# Patient Record
Sex: MALE | Race: OTHER | Employment: FULL TIME | URBAN - METROPOLITAN AREA
[De-identification: names, ages, dates, MRNs, and addresses within clinical notes are randomized per-mention and may not be internally consistent; named-entity substitution may affect disease eponyms.]

---

## 2017-02-21 ENCOUNTER — APPOINTMENT (OUTPATIENT)
Dept: ULTRASOUND IMAGING | Age: 37
DRG: 439 | End: 2017-02-21
Attending: EMERGENCY MEDICINE
Payer: COMMERCIAL

## 2017-02-21 ENCOUNTER — APPOINTMENT (OUTPATIENT)
Dept: CT IMAGING | Age: 37
DRG: 439 | End: 2017-02-21
Attending: EMERGENCY MEDICINE
Payer: COMMERCIAL

## 2017-02-21 ENCOUNTER — HOSPITAL ENCOUNTER (INPATIENT)
Facility: HOSPITAL | Age: 37
LOS: 5 days | Discharge: HOME | DRG: 439 | End: 2017-02-26
Attending: EMERGENCY MEDICINE | Admitting: STUDENT IN AN ORGANIZED HEALTH CARE EDUCATION/TRAINING PROGRAM
Payer: COMMERCIAL

## 2017-02-21 DIAGNOSIS — K85.90 ACUTE PANCREATITIS, UNSPECIFIED COMPLICATION STATUS, UNSPECIFIED PANCREATITIS TYPE: Primary | ICD-10-CM

## 2017-02-21 PROBLEM — D72.829 LEUKOCYTOSIS: Status: ACTIVE | Noted: 2017-02-21

## 2017-02-21 PROBLEM — R73.9 HYPERGLYCEMIA: Status: ACTIVE | Noted: 2017-02-21

## 2017-02-21 PROBLEM — E87.1 HYPONATREMIA: Status: ACTIVE | Noted: 2017-02-21

## 2017-02-21 LAB
ALBUMIN SERPL-MCNC: 3.8 G/DL (ref 3.5–4.8)
ALP LIVER SERPL-CCNC: 73 U/L (ref 45–117)
ALT SERPL-CCNC: 28 U/L (ref 17–63)
AST SERPL-CCNC: 34 U/L (ref 15–41)
BASOPHILS # BLD AUTO: 0.06 X10(3) UL (ref 0–0.1)
BASOPHILS NFR BLD AUTO: 0.4 %
BILIRUB SERPL-MCNC: 1 MG/DL (ref 0.1–2)
BILIRUB UR QL STRIP.AUTO: NEGATIVE
BUN BLD-MCNC: 11 MG/DL (ref 8–20)
CALCIUM BLD-MCNC: 8 MG/DL (ref 8.3–10.3)
CHLORIDE: 96 MMOL/L (ref 101–111)
CLARITY UR REFRACT.AUTO: CLEAR
CO2: 24 MMOL/L (ref 22–32)
COLOR UR AUTO: YELLOW
CREAT BLD-MCNC: 1.08 MG/DL (ref 0.7–1.3)
EOSINOPHIL # BLD AUTO: 0.07 X10(3) UL (ref 0–0.3)
EOSINOPHIL NFR BLD AUTO: 0.5 %
ERYTHROCYTE [DISTWIDTH] IN BLOOD BY AUTOMATED COUNT: 12.9 % (ref 11.5–16)
GLUCOSE BLD-MCNC: 128 MG/DL (ref 70–99)
GLUCOSE UR STRIP.AUTO-MCNC: NEGATIVE MG/DL
HCT VFR BLD AUTO: 44.9 % (ref 37–53)
HGB BLD-MCNC: 16.8 G/DL (ref 13–17)
IMMATURE GRANULOCYTE COUNT: 0.04 X10(3) UL (ref 0–1)
IMMATURE GRANULOCYTE RATIO %: 0.3 %
KETONES UR STRIP.AUTO-MCNC: NEGATIVE MG/DL
LEUKOCYTE ESTERASE UR QL STRIP.AUTO: NEGATIVE
LIPASE: >7500 U/L (ref 73–393)
LYMPHOCYTES # BLD AUTO: 0.68 X10(3) UL (ref 0.9–4)
LYMPHOCYTES NFR BLD AUTO: 4.8 %
M PROTEIN MFR SERPL ELPH: 6.4 G/DL (ref 6.1–8.3)
MCH RBC QN AUTO: 29.4 PG (ref 27–33.2)
MCHC RBC AUTO-ENTMCNC: 37.4 G/DL (ref 31–37)
MCV RBC AUTO: 78.5 FL (ref 80–99)
MONOCYTES # BLD AUTO: 0.95 X10(3) UL (ref 0.1–0.6)
MONOCYTES NFR BLD AUTO: 6.7 %
NEUTROPHIL ABS PRELIM: 12.33 X10 (3) UL (ref 1.3–6.7)
NEUTROPHILS # BLD AUTO: 12.33 X10(3) UL (ref 1.3–6.7)
NEUTROPHILS NFR BLD AUTO: 87.3 %
NITRITE UR QL STRIP.AUTO: NEGATIVE
PH UR STRIP.AUTO: 5.5 [PH] (ref 4.5–8)
PLATELET # BLD AUTO: 220 10(3)UL (ref 150–450)
POTASSIUM SERPL-SCNC: 4.8 MMOL/L (ref 3.6–5.1)
RBC # BLD AUTO: 5.72 X10(6)UL (ref 4.3–5.7)
RED CELL DISTRIBUTION WIDTH-SD: 36.4 FL (ref 35.1–46.3)
SODIUM SERPL-SCNC: 132 MMOL/L (ref 136–144)
SP GR UR STRIP.AUTO: 1.02 (ref 1–1.03)
TRIGLYCERIDES: >5000 MG/DL (ref ?–150)
UROBILINOGEN UR STRIP.AUTO-MCNC: 0.2 MG/DL
WBC # BLD AUTO: 14.1 X10(3) UL (ref 4–13)

## 2017-02-21 PROCEDURE — 74176 CT ABD & PELVIS W/O CONTRAST: CPT

## 2017-02-21 PROCEDURE — 76700 US EXAM ABDOM COMPLETE: CPT

## 2017-02-21 PROCEDURE — 99223 1ST HOSP IP/OBS HIGH 75: CPT | Performed by: HOSPITALIST

## 2017-02-21 RX ORDER — HYDROMORPHONE HYDROCHLORIDE 1 MG/ML
1 INJECTION, SOLUTION INTRAMUSCULAR; INTRAVENOUS; SUBCUTANEOUS
Status: DISCONTINUED | OUTPATIENT
Start: 2017-02-21 | End: 2017-02-24

## 2017-02-21 RX ORDER — SODIUM CHLORIDE 9 MG/ML
INJECTION, SOLUTION INTRAVENOUS CONTINUOUS
Status: DISCONTINUED | OUTPATIENT
Start: 2017-02-22 | End: 2017-02-26

## 2017-02-21 RX ORDER — DOCUSATE SODIUM 100 MG/1
100 CAPSULE, LIQUID FILLED ORAL 2 TIMES DAILY
Status: DISCONTINUED | OUTPATIENT
Start: 2017-02-22 | End: 2017-02-26

## 2017-02-21 RX ORDER — ENOXAPARIN SODIUM 100 MG/ML
40 INJECTION SUBCUTANEOUS DAILY
Status: DISCONTINUED | OUTPATIENT
Start: 2017-02-21 | End: 2017-02-26

## 2017-02-21 RX ORDER — ONDANSETRON 2 MG/ML
8 INJECTION INTRAMUSCULAR; INTRAVENOUS EVERY 6 HOURS PRN
Status: DISCONTINUED | OUTPATIENT
Start: 2017-02-21 | End: 2017-02-26

## 2017-02-21 RX ORDER — ONDANSETRON 2 MG/ML
4 INJECTION INTRAMUSCULAR; INTRAVENOUS ONCE
Status: COMPLETED | OUTPATIENT
Start: 2017-02-21 | End: 2017-02-21

## 2017-02-21 RX ORDER — SODIUM CHLORIDE 9 MG/ML
INJECTION, SOLUTION INTRAVENOUS CONTINUOUS
Status: ACTIVE | OUTPATIENT
Start: 2017-02-21 | End: 2017-02-22

## 2017-02-21 RX ORDER — KETOROLAC TROMETHAMINE 30 MG/ML
30 INJECTION, SOLUTION INTRAMUSCULAR; INTRAVENOUS ONCE
Status: COMPLETED | OUTPATIENT
Start: 2017-02-21 | End: 2017-02-21

## 2017-02-21 RX ORDER — TRAZODONE HYDROCHLORIDE 50 MG/1
50 TABLET ORAL NIGHTLY PRN
Status: DISCONTINUED | OUTPATIENT
Start: 2017-02-21 | End: 2017-02-26

## 2017-02-21 RX ORDER — HYDROMORPHONE HYDROCHLORIDE 1 MG/ML
INJECTION, SOLUTION INTRAMUSCULAR; INTRAVENOUS; SUBCUTANEOUS
Status: DISCONTINUED | OUTPATIENT
Start: 2017-02-21 | End: 2017-02-26

## 2017-02-21 RX ORDER — HYDROMORPHONE HYDROCHLORIDE 1 MG/ML
0.5 INJECTION, SOLUTION INTRAMUSCULAR; INTRAVENOUS; SUBCUTANEOUS
Status: DISCONTINUED | OUTPATIENT
Start: 2017-02-21 | End: 2017-02-24

## 2017-02-21 RX ORDER — HYDROMORPHONE HYDROCHLORIDE 1 MG/ML
1 INJECTION, SOLUTION INTRAMUSCULAR; INTRAVENOUS; SUBCUTANEOUS ONCE
Status: COMPLETED | OUTPATIENT
Start: 2017-02-21 | End: 2017-02-21

## 2017-02-21 RX ORDER — HYDROMORPHONE HYDROCHLORIDE 1 MG/ML
0.5 INJECTION, SOLUTION INTRAMUSCULAR; INTRAVENOUS; SUBCUTANEOUS EVERY 30 MIN PRN
Status: DISPENSED | OUTPATIENT
Start: 2017-02-21 | End: 2017-02-21

## 2017-02-21 RX ORDER — ACETAMINOPHEN 325 MG/1
650 TABLET ORAL EVERY 6 HOURS PRN
Status: DISCONTINUED | OUTPATIENT
Start: 2017-02-21 | End: 2017-02-26

## 2017-02-21 RX ORDER — ONDANSETRON 2 MG/ML
4 INJECTION INTRAMUSCULAR; INTRAVENOUS EVERY 4 HOURS PRN
Status: DISCONTINUED | OUTPATIENT
Start: 2017-02-21 | End: 2017-02-21

## 2017-02-21 RX ORDER — SODIUM CHLORIDE 9 MG/ML
INJECTION, SOLUTION INTRAVENOUS CONTINUOUS
Status: DISCONTINUED | OUTPATIENT
Start: 2017-02-21 | End: 2017-02-21

## 2017-02-21 NOTE — ED PROVIDER NOTES
Patient Seen in: THE [facility] Emergency Department In [place]    History   Patient presents with:  Abdomen/Flank Pain (GI/)    Stated Complaint: ABD PAIN SINCE YESTERDAY.   DENIES N/V    HPI    41-year-old male who presents her to the emergency room compla equal reactive to light. Extra ocular motions are intact. No scleral icterus or conjunctival pallor: Neck is supple without tenderness on palpation. Head is atraumatic normocephalic. Oral mucosa moist.  Tongue is midline.     Lungs: Clear to auscultatio for panel order CBC WITH DIFFERENTIAL WITH PLATELET.   Procedure                               Abnormality         Status                     ---------                               -----------         ------                     CBC W/ DIFFERENTIAL[[id number] dilatation or calcification.    PANCREAS:  There is moderate enlargement of the pancreatic head and uncinate process. There is significant infiltration of the peripancreatic fat extending inferiorly within the mesentery.  There is mass effect upon the secon significant masses. BILIARY:  Normal appearing gallbladder, intrahepatic ducts, and common bile duct.  Common bile duct diameter is 4.0 mm.  No Malin's sign  PANCREAS:  Normal.  SPLEEN:  Normal.  KIDNEYS:  Normal.  Right kidney measures 9.7 cm.  Left kidn provider specified. Medications Prescribed:  There are no discharge medications for this patient.       Present on Admission           ICD-10-CM Noted POA    Acute pancreatitis K85.90 2/21/2017 Unknown    Hyperglycemia R73.9 2/21/2017 Yes    Hyponatremia

## 2017-02-21 NOTE — ED NOTES
Pt informed of POC- agreeable of admission. Wife requested to drive. Okayed per Dr. Karen Huerta. Report given to [facility]. Saline lock maintained wrapped with kerlix bandage.

## 2017-02-21 NOTE — H&P
[facility] HOSPITAL[facility]  History and Physical     Lizeth Cameron Patient Status:  Inpatient    12/3/1980 MRN [id number]   [facility] 2NE-A Attending Megan Paredes MD   Hosp Day # 0 PCP None Pcp     Chief Complaint: abd pain    History of neurological deficits. CNII-XII grossly intact. Musculoskeletal: Moves all extremities. Extremities: No edema or cyanosis. Integument: No rashes or lesions. Psychiatric: Appropriate mood and affect.       Diagnostic Data:      Labs:  Recent Labs   Lab

## 2017-02-22 LAB
BASOPHILS # BLD AUTO: 0.02 X10(3) UL (ref 0–0.1)
BASOPHILS NFR BLD AUTO: 0.2 %
BUN BLD-MCNC: 13 MG/DL (ref 8–20)
CALCIUM BLD-MCNC: 6.8 MG/DL (ref 8.3–10.3)
CHLORIDE: 104 MMOL/L (ref 101–111)
CO2: 26 MMOL/L (ref 22–32)
CREAT BLD-MCNC: 1.02 MG/DL (ref 0.7–1.3)
EOSINOPHIL # BLD AUTO: 0 X10(3) UL (ref 0–0.3)
EOSINOPHIL NFR BLD AUTO: 0 %
ERYTHROCYTE [DISTWIDTH] IN BLOOD BY AUTOMATED COUNT: 13.2 % (ref 11.5–16)
GLUCOSE BLD-MCNC: 170 MG/DL (ref 70–99)
HCT VFR BLD AUTO: 44.8 % (ref 37–53)
HGB BLD-MCNC: 14.9 G/DL (ref 13–17)
IMMATURE GRANULOCYTE COUNT: 0.05 X10(3) UL (ref 0–1)
IMMATURE GRANULOCYTE RATIO %: 0.5 %
IMMUNOGLOBULIN G: 776 MG/DL (ref 791–1643)
LYMPHOCYTES # BLD AUTO: 0.36 X10(3) UL (ref 0.9–4)
LYMPHOCYTES NFR BLD AUTO: 3.6 %
MCH RBC QN AUTO: 26.7 PG (ref 27–33.2)
MCHC RBC AUTO-ENTMCNC: 33.3 G/DL (ref 31–37)
MCV RBC AUTO: 80.3 FL (ref 80–99)
MONOCYTES # BLD AUTO: 0.52 X10(3) UL (ref 0.1–0.6)
MONOCYTES NFR BLD AUTO: 5.2 %
NEUTROPHIL ABS PRELIM: 9.1 X10 (3) UL (ref 1.3–6.7)
NEUTROPHILS # BLD AUTO: 9.1 X10(3) UL (ref 1.3–6.7)
NEUTROPHILS NFR BLD AUTO: 90.5 %
PLATELET # BLD AUTO: 218 10(3)UL (ref 150–450)
POTASSIUM SERPL-SCNC: 4.5 MMOL/L (ref 3.6–5.1)
POTASSIUM SERPL-SCNC: 5.8 MMOL/L (ref 3.6–5.1)
RBC # BLD AUTO: 5.58 X10(6)UL (ref 4.3–5.7)
RED CELL DISTRIBUTION WIDTH-SD: 37.9 FL (ref 35.1–46.3)
SODIUM SERPL-SCNC: 138 MMOL/L (ref 136–144)
TRIGLYCERIDES: 855 MG/DL (ref ?–150)
WBC # BLD AUTO: 10.1 X10(3) UL (ref 4–13)

## 2017-02-22 PROCEDURE — 99232 SBSQ HOSP IP/OBS MODERATE 35: CPT | Performed by: INTERNAL MEDICINE

## 2017-02-22 NOTE — CONSULTS
[facility]  Report of Consultation    Lambert Sousa Patient Status:  Inpatient    12/3/1980 MRN [id number]   [facility] 2NE-A Attending Skyla Mathews MD   Hosp Day # 0 PCP None Pcp     Reason for Consultation:  Pancreatitis    Hi °C), temperature source Oral, resp. rate 18, height 5' 5\" (1.651 m), weight 180 lb (81.647 kg), SpO2 94 %. Constitutional: Appearance: well-nourished, ill appearing  Psychiatric: Normal affect, orientation and mood.   Eyes: Normal sclera, pupils and con negative for stones, associated leukocytosis, possible high TGL induced    Plan:  If worsening symptoms repeat CT pancreas with contrast  EUS in 6-8 weeks  NPO  IV fluid hydration and electrolyte replacement  CBC in AM  IgG 4     Mel Cuevas  2/21/201

## 2017-02-22 NOTE — PROGRESS NOTES
[facility]PITAL[facility]  Progress Note     Salazar Mccoy Patient Status:  Inpatient    12/3/1980 MRN [id number]   [facility] 2NE-A Attending Pepe Lorenzana MD   Hosp Day # 1 PCP None Pcp     Chief Complaint: abdominal pain    S: Patient is do to GI.  2. Hypertriglyceridemia  - repeat level, >5000k   Will need fenofibrate   3. Hyper k - recheck   4. Hyponatremia-monitor response to hydration  5.  Fever-probably due to pancreatitis- has 2+ bacteria in urine but no urinary symptoms-hold off on abx

## 2017-02-22 NOTE — PROGRESS NOTES
[facility]  Progress Note    Isaiah Tripp Patient Status:  Inpatient    12/3/1980 MRN [id number]   [facility] 2NE-A Attending Mariano Martinez MD   [address] Day # 1 PCP None Pcp     Subjective:  Isaiah Tripp is a(n) 43year old ma

## 2017-02-22 NOTE — PLAN OF CARE
GASTROINTESTINAL - ADULT    • Minimal or absence of nausea and vomiting Progressing        Patient/Family Goals    • Patient/Family Long Term Goal Progressing    • Patient/Family Short Term Goal Progressing        Pt is AOX4, VSS, ST on tele monitor.  0.9NS

## 2017-02-22 NOTE — PLAN OF CARE
GASTROINTESTINAL - ADULT    • Minimal or absence of nausea and vomiting Progressing        Patient/Family Goals    • Patient/Family Long Term Goal Progressing    • Patient/Family Short Term Goal Progressing          Received patient alert and oriented,medi

## 2017-02-23 PROCEDURE — 99232 SBSQ HOSP IP/OBS MODERATE 35: CPT | Performed by: INTERNAL MEDICINE

## 2017-02-23 RX ORDER — FENOFIBRATE 134 MG/1
134 CAPSULE ORAL
Qty: 30 CAPSULE | Refills: 0 | Status: SHIPPED | OUTPATIENT
Start: 2017-02-24 | End: 2017-03-01

## 2017-02-23 RX ORDER — FENOFIBRATE 67 MG/1
67 CAPSULE ORAL
Status: DISCONTINUED | OUTPATIENT
Start: 2017-02-23 | End: 2017-02-23

## 2017-02-23 RX ORDER — FENOFIBRATE 134 MG/1
134 CAPSULE ORAL
Status: DISCONTINUED | OUTPATIENT
Start: 2017-02-24 | End: 2017-02-26

## 2017-02-23 RX ORDER — ATORVASTATIN CALCIUM 40 MG/1
40 TABLET, FILM COATED ORAL NIGHTLY
Qty: 30 TABLET | Refills: 0 | Status: SHIPPED | OUTPATIENT
Start: 2017-02-23 | End: 2017-02-23

## 2017-02-23 RX ORDER — ATORVASTATIN CALCIUM 40 MG/1
40 TABLET, FILM COATED ORAL NIGHTLY
Status: DISCONTINUED | OUTPATIENT
Start: 2017-02-23 | End: 2017-02-23

## 2017-02-23 NOTE — PROGRESS NOTES
[facility]ITAL[facility]  Progress Note     Glynn Olmedo Patient Status:  Inpatient    12/3/1980 MRN [id number]   [facility] 2NE-A Attending Roldan Small MD   [facility] Day # 2 PCP None Pcp     Chief Complaint: abdominal pain    S: Patient is do pancreatitis  Hypertriglyceridemia and Alcohol induced vs neoplasm   Fluids, Prn dilaudid; npo; GI consult  given CT findings of possible underlying neoplasm in differential  Follow up with GI as an outpatient post dc for EUS  2.  Hypertriglyceridemia  -

## 2017-02-23 NOTE — PLAN OF CARE
GASTROINTESTINAL - ADULT    • Minimal or absence of nausea and vomiting Progressing        Patient/Family Goals    • Patient/Family Long Term Goal Progressing    • Patient/Family Short Term Goal Progressing          ST goal: decrease pain  LT goal: home

## 2017-02-23 NOTE — PLAN OF CARE
GASTROINTESTINAL - ADULT    • Minimal or absence of nausea and vomiting Progressing        Patient/Family Goals    • Patient/Family Long Term Goal Progressing    • Patient/Family Short Term Goal Progressing          Pt.  Alertx4, calm, cooperative, resting

## 2017-02-23 NOTE — CM/SW NOTE
CM asked to provide pt list of pcp providers for follow up after discharge. Pt and spouse given list of [place] [address] physicians and phone number for physician referral line. Also informed to contact insurance provider.  Pt and spouse verbalize under

## 2017-02-23 NOTE — PROGRESS NOTES
[facility]  Progress Note    Isaiah Tripp Patient Status:  Inpatient    12/3/1980 MRN [id number]   [facility] 2NE-A Attending Mariano Martinez MD   [facility] Day # 2 PCP None Pcp     Subjective:  Isaiah Tripp is a(n) 43year old ma

## 2017-02-23 NOTE — DISCHARGE SUMMARY
[facility]  DISCHARGE SUMMARY     Lizeth Cameron Patient Status:  Inpatient    12/3/1980 MRN [id number]   [facility] 2NE-A Attending Juan C Guzman MD   [facility] Day # 5 PCP None Pcp     Date of Admission: 2017  Date of Discharge pro-calcitonin was also equivocal.  Due to low-grade fevers he will be covered empirically with Levaquin for suspected pneumonia but clinically does not look to have a pneumonia. No intra-abdominal infection is suspected.     Procedures during hospitalizat wall thickening, lesion, or calculus.     PELVIC NODES:  Normal.  No adenopathy.     PELVIC ORGANS:  Normal.  No visible mass.  Pelvic organs appropriate for patient age.     BONES:  Normal.  No bony lesion or fracture.     LUNG BASES:  There is a new trac paper prescription for each of these medications    - fenofibrate micronized 134 MG Caps  - HYDROcodone-acetaminophen  MG Tabs  - levofloxacin 750 MG Tabs          Follow-up appointment:   Patient has a new PCP set up with [facility] on Wednesday    Vital s

## 2017-02-24 ENCOUNTER — APPOINTMENT (OUTPATIENT)
Dept: GENERAL RADIOLOGY | Facility: HOSPITAL | Age: 37
DRG: 439 | End: 2017-02-24
Attending: INTERNAL MEDICINE
Payer: COMMERCIAL

## 2017-02-24 ENCOUNTER — APPOINTMENT (OUTPATIENT)
Dept: CT IMAGING | Facility: HOSPITAL | Age: 37
DRG: 439 | End: 2017-02-24
Attending: NURSE PRACTITIONER
Payer: COMMERCIAL

## 2017-02-24 LAB
ALBUMIN SERPL-MCNC: 2.2 G/DL (ref 3.5–4.8)
ALP LIVER SERPL-CCNC: 51 U/L (ref 45–117)
ALT SERPL-CCNC: 16 U/L (ref 17–63)
AST SERPL-CCNC: 15 U/L (ref 15–41)
BASOPHILS # BLD AUTO: 0.02 X10(3) UL (ref 0–0.1)
BASOPHILS NFR BLD AUTO: 0.2 %
BILIRUB SERPL-MCNC: 1.1 MG/DL (ref 0.1–2)
BILIRUB UR QL STRIP.AUTO: NEGATIVE
BUN BLD-MCNC: 9 MG/DL (ref 8–20)
CALCIUM BLD-MCNC: 7.7 MG/DL (ref 8.3–10.3)
CHLORIDE: 104 MMOL/L (ref 101–111)
CLARITY UR REFRACT.AUTO: CLEAR
CO2: 29 MMOL/L (ref 22–32)
COLOR UR AUTO: YELLOW
CREAT BLD-MCNC: 0.74 MG/DL (ref 0.7–1.3)
EOSINOPHIL # BLD AUTO: 0.01 X10(3) UL (ref 0–0.3)
EOSINOPHIL NFR BLD AUTO: 0.1 %
ERYTHROCYTE [DISTWIDTH] IN BLOOD BY AUTOMATED COUNT: 13.2 % (ref 11.5–16)
GLUCOSE BLD-MCNC: 115 MG/DL (ref 70–99)
GLUCOSE UR STRIP.AUTO-MCNC: 150 MG/DL
HCT VFR BLD AUTO: 38.2 % (ref 37–53)
HGB BLD-MCNC: 12.4 G/DL (ref 13–17)
IMMATURE GRANULOCYTE COUNT: 0.05 X10(3) UL (ref 0–1)
IMMATURE GRANULOCYTE RATIO %: 0.5 %
LEUKOCYTE ESTERASE UR QL STRIP.AUTO: NEGATIVE
LYMPHOCYTES # BLD AUTO: 0.45 X10(3) UL (ref 0.9–4)
LYMPHOCYTES NFR BLD AUTO: 4.7 %
M PROTEIN MFR SERPL ELPH: 6.4 G/DL (ref 6.1–8.3)
MCH RBC QN AUTO: 26.3 PG (ref 27–33.2)
MCHC RBC AUTO-ENTMCNC: 32.5 G/DL (ref 31–37)
MCV RBC AUTO: 81.1 FL (ref 80–99)
MONOCYTES # BLD AUTO: 0.89 X10(3) UL (ref 0.1–0.6)
MONOCYTES NFR BLD AUTO: 9.3 %
NEUTROPHIL ABS PRELIM: 8.13 X10 (3) UL (ref 1.3–6.7)
NEUTROPHILS # BLD AUTO: 8.13 X10(3) UL (ref 1.3–6.7)
NEUTROPHILS NFR BLD AUTO: 85.2 %
NITRITE UR QL STRIP.AUTO: NEGATIVE
PH UR STRIP.AUTO: 7 [PH] (ref 4.5–8)
PLATELET # BLD AUTO: 225 10(3)UL (ref 150–450)
POTASSIUM SERPL-SCNC: 3.8 MMOL/L (ref 3.6–5.1)
PROT UR STRIP.AUTO-MCNC: NEGATIVE MG/DL
RBC # BLD AUTO: 4.71 X10(6)UL (ref 4.3–5.7)
RBC UR QL AUTO: NEGATIVE
RED CELL DISTRIBUTION WIDTH-SD: 39 FL (ref 35.1–46.3)
SODIUM SERPL-SCNC: 139 MMOL/L (ref 136–144)
SP GR UR STRIP.AUTO: 1.01 (ref 1–1.03)
UROBILINOGEN UR STRIP.AUTO-MCNC: 4 MG/DL
WBC # BLD AUTO: 9.6 X10(3) UL (ref 4–13)

## 2017-02-24 PROCEDURE — 99232 SBSQ HOSP IP/OBS MODERATE 35: CPT | Performed by: INTERNAL MEDICINE

## 2017-02-24 PROCEDURE — 74177 CT ABD & PELVIS W/CONTRAST: CPT

## 2017-02-24 PROCEDURE — 71020 XR CHEST PA + LAT CHEST (CPT=71020): CPT

## 2017-02-24 RX ORDER — HYDROMORPHONE HYDROCHLORIDE 1 MG/ML
1 INJECTION, SOLUTION INTRAMUSCULAR; INTRAVENOUS; SUBCUTANEOUS EVERY 2 HOUR PRN
Status: DISCONTINUED | OUTPATIENT
Start: 2017-02-24 | End: 2017-02-26

## 2017-02-24 RX ORDER — HYDROCODONE BITARTRATE AND ACETAMINOPHEN 10; 325 MG/1; MG/1
1 TABLET ORAL EVERY 6 HOURS PRN
Status: DISCONTINUED | OUTPATIENT
Start: 2017-02-24 | End: 2017-02-26

## 2017-02-24 RX ORDER — HYDROMORPHONE HYDROCHLORIDE 1 MG/ML
INJECTION, SOLUTION INTRAMUSCULAR; INTRAVENOUS; SUBCUTANEOUS
Status: DISPENSED
Start: 2017-02-24 | End: 2017-02-24

## 2017-02-24 RX ORDER — HYDROCODONE BITARTRATE AND ACETAMINOPHEN 10; 325 MG/1; MG/1
2 TABLET ORAL EVERY 6 HOURS PRN
Qty: 30 TABLET | Refills: 0 | Status: SHIPPED | OUTPATIENT
Start: 2017-02-24 | End: 2017-03-01 | Stop reason: ALTCHOICE

## 2017-02-24 RX ORDER — POTASSIUM CHLORIDE 20 MEQ/1
40 TABLET, EXTENDED RELEASE ORAL ONCE
Status: COMPLETED | OUTPATIENT
Start: 2017-02-24 | End: 2017-02-24

## 2017-02-24 RX ORDER — HYDROCODONE BITARTRATE AND ACETAMINOPHEN 10; 325 MG/1; MG/1
2 TABLET ORAL EVERY 6 HOURS PRN
Status: DISCONTINUED | OUTPATIENT
Start: 2017-02-24 | End: 2017-02-26

## 2017-02-24 RX ORDER — HYDROMORPHONE HYDROCHLORIDE 1 MG/ML
0.5 INJECTION, SOLUTION INTRAMUSCULAR; INTRAVENOUS; SUBCUTANEOUS
Status: DISCONTINUED | OUTPATIENT
Start: 2017-02-24 | End: 2017-02-26

## 2017-02-24 NOTE — CM/SW NOTE
Patient discussed in rounds with RN. Patient still spiking temp, no discharge anticipated for today. Patient has no d/c needs identified at this time, CM provided PCP information to patient's family yesterday (2/23).  CM/ABDIAZIZ to remain available should any ot

## 2017-02-24 NOTE — PROGRESS NOTES
Gastroenterology Progress Note  Lizeth Cameron Patient Status:  Inpatient    12/3/1980 MRN [id number]   [facility] 2NE-A Attending Juan C Guzman MD   Hosp Day # 3 PCP None Pcp     Chief Com Continues to spike fevers and abdomen is soft but distended with hypoactive bowel sounds. Plan:   1. CT abdomen and pelvis with IV contrast now  2. IS hourly while awake   3. Encourage activity as tolerated   4.  Pain medications per PCP recommendations

## 2017-02-24 NOTE — PAYOR COMM NOTE
Attending Physician: Elsie Liang MD    Review Type: CONTINUED STAY  Reviewer: Mauri Meckel     Date: February 24, 2017 - 1:37 PM  Payor: [id number] [place] [address] Number: [id number]  Admit date: 2/21/2017  9:19 AM        REVIEWER COMMENTS  Vital infusion     Date Action Dose Route User    2/24/2017 0819 New [address] (none) Intravenous Danae Xiao RN    2/24/2017 0244 New Bag (none) Intravenous Anthony Cortes RN    2/23/2017 3141 New Bag (none) Intravenous Anthony Cortes RN    2/23/2017 180

## 2017-02-24 NOTE — PROGRESS NOTES
[facility] HOSPITAL[facility]  Progress Note     Tatyana Alaniz Patient Status:  Inpatient    12/3/1980 MRN [id number]   [facility] 2NE-A Attending Des Dawn MD   Hosp Day # 3 PCP None Pcp     Chief Complaint: abdominal pain    S: Patient is do enoxaparin  40 mg Subcutaneous Daily       ASSESSMENT / PLAN:     1.  Acute pancreatitis  Hypertriglyceridemia and Alcohol induced vs neoplasm   Fluids, PO pain meds then Prn dilaudid; advancing diet; GI consult  DC on PO pain meds  Follow up with GI as an

## 2017-02-24 NOTE — PLAN OF CARE
GASTROINTESTINAL - ADULT    • Minimal or absence of nausea and vomiting Progressing            Pt alert and able to make needs known. C/o pain to right side abdomen,pin medication given as ordered. No nausea or vomiting noted.  Plan of care discussed with p

## 2017-02-24 NOTE — PLAN OF CARE
Comments:   Pt A&OX4, VSS on RA w/ and maintaining ST on telemetry. Acute pancreatitis being treated w/IVF, slowly advancing diet, and pain medications.   Denies any chest pain, palpitations, chest pressure, SOB/MANN, N/V, dizziness, blurry vision, or an

## 2017-02-25 LAB
BAND %: 13 %
BASOPHIL % MANUAL: 0 %
BASOPHIL ABSOLUTE MANUAL: 0 X10(3) UL (ref 0–0.1)
BUN BLD-MCNC: 8 MG/DL (ref 8–20)
CALCIUM BLD-MCNC: 7.8 MG/DL (ref 8.3–10.3)
CHLORIDE: 106 MMOL/L (ref 101–111)
CO2: 27 MMOL/L (ref 22–32)
CREAT BLD-MCNC: 0.66 MG/DL (ref 0.7–1.3)
EOSINOPHIL % MANUAL: 0 %
EOSINOPHIL ABSOLUTE MANUAL: 0 X10(3) UL (ref 0–0.3)
ERYTHROCYTE [DISTWIDTH] IN BLOOD BY AUTOMATED COUNT: 13.3 % (ref 11.5–16)
GLUCOSE BLD-MCNC: 127 MG/DL (ref 70–99)
HAV IGM SER QL: 2.2 MG/DL (ref 1.7–3)
HCT VFR BLD AUTO: 37.7 % (ref 37–53)
HGB BLD-MCNC: 11.8 G/DL (ref 13–17)
LYMPHOCYTE % MANUAL: 2 %
LYMPHOCYTE ABSOLUTE MANUAL: 0.23 X10(3) UL (ref 0.9–4)
MCH RBC QN AUTO: 26.3 PG (ref 27–33.2)
MCHC RBC AUTO-ENTMCNC: 31.3 G/DL (ref 31–37)
MCV RBC AUTO: 84 FL (ref 80–99)
MONOCYTE % MANUAL: 12 %
MONOCYTE ABSOLUTE MANUAL: 1.36 X10(3) UL (ref 0.1–0.6)
MORPHOLOGY: NORMAL
NEUTROPHIL ABS PRELIM: 9.52 X10 (3) UL (ref 1.3–6.7)
NEUTROPHIL ABSOLUTE MANUAL: 9.72 X10(3) UL (ref 1.3–6.7)
NEUTROPHILS % MANUAL: 73 %
NRBC CALCULATED: 1
PLATELET # BLD AUTO: 209 10(3)UL (ref 150–450)
PLATELET MORPHOLOGY: NORMAL
POTASSIUM SERPL-SCNC: 3.7 MMOL/L (ref 3.6–5.1)
PROCALCITONIN SERPL-MCNC: 0.67 NG/ML (ref ?–0.11)
RBC # BLD AUTO: 4.49 X10(6)UL (ref 4.3–5.7)
RED CELL DISTRIBUTION WIDTH-SD: 41.3 FL (ref 35.1–46.3)
SODIUM SERPL-SCNC: 139 MMOL/L (ref 136–144)
TOTAL CELLS COUNTED: 100
WBC # BLD AUTO: 11.3 X10(3) UL (ref 4–13)

## 2017-02-25 PROCEDURE — 99232 SBSQ HOSP IP/OBS MODERATE 35: CPT | Performed by: INTERNAL MEDICINE

## 2017-02-25 RX ORDER — POTASSIUM CHLORIDE 20 MEQ/1
40 TABLET, EXTENDED RELEASE ORAL ONCE
Status: COMPLETED | OUTPATIENT
Start: 2017-02-25 | End: 2017-02-25

## 2017-02-25 RX ORDER — LEVOFLOXACIN 5 MG/ML
750 INJECTION, SOLUTION INTRAVENOUS EVERY 24 HOURS
Status: DISCONTINUED | OUTPATIENT
Start: 2017-02-25 | End: 2017-02-26

## 2017-02-25 NOTE — PROGRESS NOTES
Gastroenterology Progress Note  Blank Da Silva Patient Status:  Inpatient    12/3/1980 MRN [id number]   [facility] 2NE-A Attending Carlos Eduardo Pace MD   [facility] Day # 4 PCP None Pcp     Chief Com pneumonia  Plan:   Antibiotics per hospitalist service  3. Encourage activity as tolerated   4. Pain control  Total time spent in the care of the patient today: 25 minutes.     Andi Ramirez MD  [facility] Gastroenterology  2/25/2017  4:06 PM

## 2017-02-25 NOTE — PLAN OF CARE
Problem: Patient/Family Goals  Goal: Patient/Family Long Term Goal  Patient’s Long Term Goal: Remain free from abdominal & back pain.     Interventions:  - See additional Care Plan goals for specific interventions   Outcome: Progressing  Continues to have a

## 2017-02-25 NOTE — PLAN OF CARE
Patient with acute pancreatitis. IVF infusing along with advanced diet and pain management. He states his pain is in back area. Temps under control tonight. Plan of care updated with patient and family member at bedside.

## 2017-02-25 NOTE — PROGRESS NOTES
[facility]ITAL[facility]  Progress Note     Darnell Ortega Patient Status:  Inpatient    12/3/1980 MRN [id number]   [facility] 2NE-A Attending Ryan Muhammad MD   Hosp Day # 4 PCP None Pcp     Chief Complaint: abdominal pain    S: Patient is do PLAN:     1.  Acute pancreatitis  Hypertriglyceridemia and Alcohol induced  CT scan demonstrates continuing pancreatitis without necrosis, asbcess, or masses  Fluids, PO pain meds then Prn dilaudid; advancing diet; GI consult  DC on PO pain meds  F/u with G

## 2017-02-26 ENCOUNTER — APPOINTMENT (OUTPATIENT)
Dept: GENERAL RADIOLOGY | Facility: HOSPITAL | Age: 37
DRG: 439 | End: 2017-02-26
Attending: INTERNAL MEDICINE
Payer: COMMERCIAL

## 2017-02-26 VITALS
DIASTOLIC BLOOD PRESSURE: 83 MMHG | OXYGEN SATURATION: 98 % | WEIGHT: 180 LBS | HEART RATE: 102 BPM | SYSTOLIC BLOOD PRESSURE: 137 MMHG | HEIGHT: 65 IN | TEMPERATURE: 99 F | BODY MASS INDEX: 29.99 KG/M2 | RESPIRATION RATE: 18 BRPM

## 2017-02-26 LAB
LEGIONELLA PNEUMOPHILA AG, URI: NEGATIVE
POTASSIUM SERPL-SCNC: 3.7 MMOL/L (ref 3.6–5.1)
STREPTOCOCCUS PNEUMONIAE AG, U: NEGATIVE

## 2017-02-26 PROCEDURE — 99239 HOSP IP/OBS DSCHRG MGMT >30: CPT | Performed by: INTERNAL MEDICINE

## 2017-02-26 PROCEDURE — 71020 XR CHEST PA + LAT CHEST (CPT=71020): CPT

## 2017-02-26 RX ORDER — POTASSIUM CHLORIDE 20 MEQ/1
40 TABLET, EXTENDED RELEASE ORAL ONCE
Status: COMPLETED | OUTPATIENT
Start: 2017-02-26 | End: 2017-02-26

## 2017-02-26 RX ORDER — LEVOFLOXACIN 750 MG/1
750 TABLET ORAL DAILY
Qty: 5 TABLET | Refills: 0 | Status: SHIPPED | OUTPATIENT
Start: 2017-02-26 | End: 2017-03-03

## 2017-02-26 RX ORDER — POTASSIUM CHLORIDE 20 MEQ/1
40 TABLET, EXTENDED RELEASE ORAL ONCE
Status: DISCONTINUED | OUTPATIENT
Start: 2017-02-26 | End: 2017-02-26

## 2017-02-26 NOTE — PLAN OF CARE
GASTROINTESTINAL - ADULT    • Minimal or absence of nausea and vomiting Progressing        Patient/Family Goals    • Patient/Family Long Term Goal Progressing    • Patient/Family Short Term Goal Progressing        Received patient at 1930, alert and orient

## 2017-02-26 NOTE — PROGRESS NOTES
Gastroenterology Progress Note  Sherrie Bárbara Patient Status:  Inpatient    12/3/1980 MRN [id number]   [facility] 2NE-A Attending Izaiah Dubose MD   [address] Day # 5 PCP None Pcp     Chief Com PM

## 2017-02-26 NOTE — PLAN OF CARE
Ambulating in hallway, states he has abdominal pain and is sweating, appears diaphoretic, Dr. Gonzalez Abts here to see patient. Discussed with patient , plan for discharge today and follow up plans after discharge to see in office 4 weeks.  Dr. Moi Jessica ordered ch

## 2017-02-26 NOTE — PLAN OF CARE
Problem: Patient/Family Goals  Goal: Patient/Family Long Term Goal  Patient’s Long Term Goal: Remain free from abdominal & back pain.     Interventions:  - See additional Care Plan goals for specific interventions   Outcome: Progressing  Progressing, contin

## 2017-02-26 NOTE — PROGRESS NOTES
Spoke w/Dr. Indy Lopes. Rudolph re: pt's status on telemetry unit vs med/surg unit. Order rec'd that pt can be transferred to a med/surg floor, but still needs to be on a telemetry monitor.      02/26/17 1051   Provider Notification   Reason for Communication Other (

## 2017-02-27 NOTE — CM/SW NOTE
02/27/17 0800   Discharge disposition   Discharged to: Home or [address] [place] [address] services after discharge None   Discharge transportation Private car   Patient discharged 2/26/17

## 2017-03-01 ENCOUNTER — APPOINTMENT (OUTPATIENT)
Dept: LAB | Age: 37
End: 2017-03-01
Attending: INTERNAL MEDICINE
Payer: COMMERCIAL

## 2017-03-01 ENCOUNTER — OFFICE VISIT (OUTPATIENT)
Dept: INTERNAL MEDICINE CLINIC | Facility: CLINIC | Age: 37
End: 2017-03-01

## 2017-03-01 VITALS
HEART RATE: 104 BPM | RESPIRATION RATE: 12 BRPM | BODY MASS INDEX: 29.96 KG/M2 | HEIGHT: 65.5 IN | DIASTOLIC BLOOD PRESSURE: 80 MMHG | TEMPERATURE: 99 F | OXYGEN SATURATION: 98 % | WEIGHT: 182 LBS | SYSTOLIC BLOOD PRESSURE: 110 MMHG

## 2017-03-01 DIAGNOSIS — E78.1 HYPERTRIGLYCERIDEMIA: Primary | ICD-10-CM

## 2017-03-01 DIAGNOSIS — R19.7 DIARRHEA, UNSPECIFIED TYPE: ICD-10-CM

## 2017-03-01 DIAGNOSIS — R19.7 DIARRHEA OF PRESUMED INFECTIOUS ORIGIN: ICD-10-CM

## 2017-03-01 DIAGNOSIS — J18.9 PNEUMONIA OF RIGHT LOWER LOBE DUE TO INFECTIOUS ORGANISM: ICD-10-CM

## 2017-03-01 DIAGNOSIS — K85.90 ACUTE PANCREATITIS, UNSPECIFIED COMPLICATION STATUS, UNSPECIFIED PANCREATITIS TYPE: ICD-10-CM

## 2017-03-01 PROBLEM — E87.1 HYPONATREMIA: Status: RESOLVED | Noted: 2017-02-21 | Resolved: 2017-03-01

## 2017-03-01 PROBLEM — R73.9 HYPERGLYCEMIA: Status: RESOLVED | Noted: 2017-02-21 | Resolved: 2017-03-01

## 2017-03-01 PROBLEM — D72.829 LEUKOCYTOSIS: Status: RESOLVED | Noted: 2017-02-21 | Resolved: 2017-03-01

## 2017-03-01 PROCEDURE — 99214 OFFICE O/P EST MOD 30 MIN: CPT | Performed by: INTERNAL MEDICINE

## 2017-03-01 PROCEDURE — 87493 C DIFF AMPLIFIED PROBE: CPT

## 2017-03-01 RX ORDER — FENOFIBRATE 134 MG/1
134 CAPSULE ORAL
Qty: 30 CAPSULE | Refills: 0 | Status: SHIPPED | OUTPATIENT
Start: 2017-03-01 | End: 2017-05-04

## 2017-03-01 NOTE — PATIENT INSTRUCTIONS
Please continue the fenofibrate for 6 weeks and then have fasting labs completed. See me 1 week later for a physical exam.   Discharge Instructions for Acute Pancreatitis  You have been diagnosed with acute pancreatitis.  Your pancreas is inflamed or swolle · Before starting any new medicine, ask your provider if it will harm your pancreas. This includes any new over-the-counter medicines, vitamins, or herbal supplements.    · Tell your provider if you lose weight without dieting.   · Be aware of symptoms that If you have acute pancreatitis, you may be in the hospital for a few days. For part of this time, you likely won’t be allowed to eat or drink.  This lets your pancreas rest and heal. If your pancreatitis is severe, you will not be able to eat and drink and

## 2017-03-01 NOTE — PROGRESS NOTES
Teo Narvaez is a 43year old male. HPI:   Patient presents for RLL pna and acute pancreatitis. He was hospitalized from 2/21/17-2/26/17 at [facility] 399 revealed TG of 855, lipase >7500.   1. HyperTG: just started fenofibrate yesterday.  H Resp 12  Ht 65.5\"  Wt 182 lb  BMI 29.82 kg/m2  SpO2 98%  GENERAL: A&O well developed, well nourished,in no apparent distress  SKIN: no rashes,no suspicious lesions  HEENT: atraumatic, MMM, throat is clear  NECK: supple, no jvd, no thyromegaly  LUNGS: paula

## 2017-05-05 RX ORDER — FENOFIBRATE 134 MG/1
CAPSULE ORAL
Qty: 30 CAPSULE | Refills: 0 | Status: SHIPPED | OUTPATIENT
Start: 2017-05-05 | End: 2017-06-15

## 2017-06-16 RX ORDER — FENOFIBRATE 134 MG/1
CAPSULE ORAL
Qty: 30 CAPSULE | Refills: 0 | Status: SHIPPED | OUTPATIENT
Start: 2017-06-16

## 2017-07-25 RX ORDER — FENOFIBRATE 134 MG/1
CAPSULE ORAL
Qty: 30 CAPSULE | Refills: 0 | OUTPATIENT
Start: 2017-07-25
